# Patient Record
Sex: FEMALE | Race: WHITE | NOT HISPANIC OR LATINO | URBAN - METROPOLITAN AREA
[De-identification: names, ages, dates, MRNs, and addresses within clinical notes are randomized per-mention and may not be internally consistent; named-entity substitution may affect disease eponyms.]

---
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
## 2019-07-12 ENCOUNTER — OFFICE VISIT - HEALTHEAST (OUTPATIENT)
Dept: FAMILY MEDICINE | Facility: CLINIC | Age: 19
End: 2019-07-12

## 2019-07-12 DIAGNOSIS — L03.032 CELLULITIS OF TOE OF LEFT FOOT: ICD-10-CM

## 2019-07-12 RX ORDER — ESCITALOPRAM OXALATE 10 MG/1
10 TABLET ORAL DAILY
Status: SHIPPED | COMMUNITY
Start: 2019-06-20

## 2021-05-31 VITALS — WEIGHT: 190 LBS

## 2021-06-03 VITALS — WEIGHT: 203.1 LBS

## 2021-06-11 NOTE — PROGRESS NOTES
SUBJECTIVE:    Court Puckett is a 17 y.o. female who presents today for evaluation of sinus symptoms for the past 2 weeks.  She used virtual MD visit early in her illness and she was prescribed doxy which she took but it didn't help much. She complains of cough, congestion, there has been sinus pressure, drainage and nocturnal cough.  She denies wheezing.  She has been taking over-the-counter Sudafed as well.  Symptoms seem to be worsening overall.  Fever absent.    OBJECTIVE:  HEENT: Eyes, ears, nose and throat are unremarkable.  She had tonsillectomy as a child.  There is erythema and PND seen in the back of the throat.  Face: frontal and maxillary sinuses bilaterally are tender to palpation.  Neck: supple without lymphadenopathy  LUNGS: clear to auscultation, no wheezes or rales and unlabored breathing  SKIN: Warm and dry with good color.    ASSESSMENT:  Acute Sinusitis    PLAN:  Flonase plus Zyrtec was advised.  Also Tessalon Perles for cough suppression.    Antibiotics: No antibiotics indicated at this time    Recommended Neti Pot/Sinucleanse with sterile water.    Pt will follow up in  2 weeks if not improving, and will call or return sooner if worsening.

## 2021-06-17 NOTE — PATIENT INSTRUCTIONS - HE
Patient Instructions by Arron Thomas PA-C at 7/12/2019  4:50 PM     Author: Arron Thomas PA-C Service: -- Author Type: Physician Assistant    Filed: 7/12/2019  5:33 PM Encounter Date: 7/12/2019 Status: Addendum    : Arron Thomas PA-C (Physician Assistant)    Related Notes: Original Note by Arron Thomas PA-C (Physician Assistant) filed at 7/12/2019  5:33 PM       Keep the area clean dry and protected.  Do not immerse in [place] or other surface corona.  Take the antibiotic as written.  Elevate the foot above the level of the heart is much as possible.  Follow-up with your podiatrist on Monday for reevaluation and treatment.      Patient Education     Discharge Instructions for Cellulitis  You have been diagnosed with cellulitis. This is an infection in the deepest layer of the skin. In some cases, the infection also affects the muscle. Cellulitis is caused by bacteria. The bacteria can enter the body through broken skin. This can happen with a cut, scratch, animal bite, or an insect bite that has been scratched. You may have been treated in the hospital with antibiotics and fluids. You will likely be given a prescription for antibiotics to take at home. This sheet will help you take care of yourself at home.  Home care  When you are home:    Take the prescribed antibiotic medicine you are given as directed until it is gone. Take it even if you feel better. It treats the infection and stops it from returning. Not taking all the medicine can make future infections hard to treat.    Keep the infected area clean.    When possible, raise the infected area above the level of your heart. This helps keep swelling down.    Talk with your healthcare provider if you are in pain. Ask what kind of over-the-counter medicine you can take for pain.    Apply clean bandages as advised.    Take your temperature once a day for a week.    Wash your hands often to prevent spreading the infection.  In the future, wash  your hands before and after you touch cuts, scratches, or bandages. This will help prevent infection.   When to call your healthcare provider  Call your healthcare provider immediately if you have any of the following:    Difficulty or pain when moving the joints above or below the infected area    Discharge or pus draining from the area    Fever of 100.4 F (38 C) or higher, or as directed by your healthcare provider    Pain that gets worse in or around the infected     Redness that gets worse in or around the infected area, particularly if the area of redness expands to a wider area    Shaking chills    Swelling of the infected area    Vomiting   Date Last Reviewed: 8/1/2016 2000-2017 [employer] [address] [place] [address]. All rights reserved. This information is not intended as a substitute for professional medical care. Always follow your healthcare professional's instructions.           Patient Education     Cellulitis  Cellulitis is an infection of the deep layers of skin. A break in the skin, such as a cut or scratch, can let bacteria under the skin. If the bacteria get to deep layers of the skin, it can be serious. If not treated, cellulitis can get into the bloodstream and lymph nodes. The infection can then spread throughout the body. This causes serious illness.  Cellulitis causes the affected skin to become red, swollen, warm, and sore. The reddened areas have a visible border. An open sore may leak fluid (pus). You may have a fever, chills, and pain.  Cellulitis is treated with antibiotics taken for 7 to 10 days. An open sore may be cleaned and covered with cool wet gauze. Symptoms should get better 1 to 2 days after treatment is started. Make sure to take all the antibiotics for the full number of days until they are gone. Keep taking the medicine even if your symptoms go away.  Home care  Follow these tips:    Limit the use of the part of your body with  cellulitis.     If the infection is on your leg, keep your leg raised while sitting. This will help to reduce swelling.    Take all of the antibiotic medicine exactly as directed until it is gone. Do not miss any doses, especially during the first 7 days. Dont stop taking the medicine when your symptoms get better.    Keep the affected area clean and dry.    Wash your hands with soap and warm water before and after touching your skin. Anyone else who touches your skin should also wash his or her hands. Don't share towels.  Follow-up care  Follow up with your healthcare provider, or as advised. If your infection does not go away on the first antibiotic, your healthcare provider will prescribe a different one.  When to seek medical advice  Call your healthcare provider right away if any of these occur:    Red areas that spread    Swelling or pain that gets worse    Fluid leaking from the skin (pus)    Fever higher of 100.4  F (38.0  C) or higher after 2 days on antibiotics  Date Last Reviewed: 9/1/2016 2000-2017 [employer]. [address] [place] [address], [place] [address]. All rights reserved. This information is not intended as a substitute for professional medical care. Always follow your healthcare professional's instructions.

## 2021-06-27 NOTE — PROGRESS NOTES
"Progress Notes by Arron Thomas PA-C at 7/12/2019  4:50 PM     Author: Arron Thomas PA-C Service: -- Author Type: Physician Assistant    Filed: 7/19/2019  6:26 PM Encounter Date: 7/12/2019 Status: Signed    : Arron Thomas PA-C (Physician Assistant)       Subjective:      Patient ID: Court Puckett is a 19 y.o. female.    Chief Complaint:    HPI  Court Puckett is a 19 y.o. female who presents today complaining of concern for a possible infected toe.  Patient states that she had ingrown toenail removed on both the tibial and fibular side on July 3, 2019 by her podiatrist.  He told her to keep it clean dry and protected.  [place].  Next for the ingrown toenail and took her last dose of antibiotics on July 5 the day before swimming in the lake.  The toe is now become very swollen tender and painful and she is concerned that she has \"an infection\".    Patient has previously used Augmentin in the past without any incident.    No past medical history on file.    No past surgical history on file.    No family history on file.    Social History     Tobacco Use   ? Smoking status: Never Smoker   ? Smokeless tobacco: Never Used   Substance Use Topics   ? Alcohol use: Not on file   ? Drug use: Not on file       Review of Systems  As above in HPI, otherwise balance of Review of Systems are negative.    Objective:     /72 (Patient Site: Right Arm, Patient Position: Sitting, Cuff Size: Adult Regular)   Pulse 96   Temp 98.5  F (36.9  C) (Oral)   Resp 16   Wt 203 lb 1.6 oz (92.1 kg)   SpO2 96%     Physical Exam  General: Patient is resting comfortably no acute distress is afebrile  HEENT: Head is normocephalic atraumatic   Skin: Without rash non-diaphoretic  Musculoskeletal: Right big toe is inspected.  The patient has erythema edema and tenderness to palpation.  There is slight discharge from both sides of the nails.  This appears to be consistent with " cellulitis.      Assessment:     Procedures    The encounter diagnosis was Cellulitis of toe of left foot.    Plan:     1. Cellulitis of toe of left foot  amoxicillin-clavulanate (AUGMENTIN) 875-125 mg per tablet       I am concerned that the patient does have cellulitis of the foot.  Since she has went swimming and surface water there is a high probability that was contaminated with the swimming.  I am starting her on antibiotic with close follow-up with her podiatrist.  She is to return to the clinic or the ER if it is after hours for reevaluation treatment if she is not getting improvement after 48 hours or has any complications prior to be seen by podiatry.  Indication for return was gone over and mother and child voiced understanding questions were answered to patient's satisfaction before discharge.    Patient Instructions     Keep the area clean dry and protected.  Do not immerse in [place] or other surface corona.  Take the antibiotic as written.  Elevate the foot above the level of the heart is much as possible.  Follow-up with your podiatrist on Monday for reevaluation and treatment.      Patient Education     Discharge Instructions for Cellulitis  You have been diagnosed with cellulitis. This is an infection in the deepest layer of the skin. In some cases, the infection also affects the muscle. Cellulitis is caused by bacteria. The bacteria can enter the body through broken skin. This can happen with a cut, scratch, animal bite, or an insect bite that has been scratched. You may have been treated in the hospital with antibiotics and fluids. You will likely be given a prescription for antibiotics to take at home. This sheet will help you take care of yourself at home.  Home care  When you are home:    Take the prescribed antibiotic medicine you are given as directed until it is gone. Take it even if you feel better. It treats the infection and stops it from returning. Not taking all the medicine  can make future infections hard to treat.    Keep the infected area clean.    When possible, raise the infected area above the level of your heart. This helps keep swelling down.    Talk with your healthcare provider if you are in pain. Ask what kind of over-the-counter medicine you can take for pain.    Apply clean bandages as advised.    Take your temperature once a day for a week.    Wash your hands often to prevent spreading the infection.  In the future, wash your hands before and after you touch cuts, scratches, or bandages. This will help prevent infection.   When to call your healthcare provider  Call your healthcare provider immediately if you have any of the following:    Difficulty or pain when moving the joints above or below the infected area    Discharge or pus draining from the area    Fever of 100.4 F (38 C) or higher, or as directed by your healthcare provider    Pain that gets worse in or around the infected     Redness that gets worse in or around the infected area, particularly if the area of redness expands to a wider area    Shaking chills    Swelling of the infected area    Vomiting   Date Last Reviewed: 8/1/2016 2000-2017 [employer]. [address] [place] [address]. All rights reserved. This information is not intended as a substitute for professional medical care. Always follow your healthcare professional's instructions.           Patient Education     Cellulitis  Cellulitis is an infection of the deep layers of skin. A break in the skin, such as a cut or scratch, can let bacteria under the skin. If the bacteria get to deep layers of the skin, it can be serious. If not treated, cellulitis can get into the bloodstream and lymph nodes. The infection can then spread throughout the body. This causes serious illness.  Cellulitis causes the affected skin to become red, swollen, warm, and sore. The reddened areas have a visible border. An open sore may leak fluid (pus).  You may have a fever, chills, and pain.  Cellulitis is treated with antibiotics taken for 7 to 10 days. An open sore may be cleaned and covered with cool wet gauze. Symptoms should get better 1 to 2 days after treatment is started. Make sure to take all the antibiotics for the full number of days until they are gone. Keep taking the medicine even if your symptoms go away.  Home care  Follow these tips:    Limit the use of the part of your body with cellulitis.     If the infection is on your leg, keep your leg raised while sitting. This will help to reduce swelling.    Take all of the antibiotic medicine exactly as directed until it is gone. Do not miss any doses, especially during the first 7 days. Dont stop taking the medicine when your symptoms get better.    Keep the affected area clean and dry.    Wash your hands with soap and warm water before and after touching your skin. Anyone else who touches your skin should also wash his or her hands. Don't share towels.  Follow-up care  Follow up with your healthcare provider, or as advised. If your infection does not go away on the first antibiotic, your healthcare provider will prescribe a different one.  When to seek medical advice  Call your healthcare provider right away if any of these occur:    Red areas that spread    Swelling or pain that gets worse    Fluid leaking from the skin (pus)    Fever higher of 100.4  F (38.0  C) or higher after 2 days on antibiotics  Date Last Reviewed: 9/1/2016 2000-2017 [employer]. [address] [place] [address], [place] [address]. All rights reserved. This information is not intended as a substitute for professional medical care. Always follow your healthcare professional's instructions.